# Patient Record
Sex: MALE | Race: WHITE | Employment: FULL TIME
[De-identification: names, ages, dates, MRNs, and addresses within clinical notes are randomized per-mention and may not be internally consistent; named-entity substitution may affect disease eponyms.]

---

## 2019-11-08 ENCOUNTER — HEALTH MAINTENANCE LETTER (OUTPATIENT)
Age: 54
End: 2019-11-08

## 2019-11-12 ENCOUNTER — THERAPY VISIT (OUTPATIENT)
Dept: PHYSICAL THERAPY | Facility: CLINIC | Age: 54
End: 2019-11-12
Payer: COMMERCIAL

## 2019-11-12 DIAGNOSIS — M25.511 ACUTE PAIN OF RIGHT SHOULDER: Primary | ICD-10-CM

## 2019-11-12 PROCEDURE — 97110 THERAPEUTIC EXERCISES: CPT | Mod: GP | Performed by: PHYSICAL THERAPIST

## 2019-11-12 PROCEDURE — 97161 PT EVAL LOW COMPLEX 20 MIN: CPT | Mod: GP | Performed by: PHYSICAL THERAPIST

## 2019-11-12 PROCEDURE — 97112 NEUROMUSCULAR REEDUCATION: CPT | Mod: GP | Performed by: PHYSICAL THERAPIST

## 2019-11-12 NOTE — LETTER
STEFANIE WICKINE [facility] [place]  [address]  [place] [address]  [phone]    2019    Re: Tien Dominguez   :   1965  MRN:  [id number]   REFERRING PHYSICIAN:   Sundar ARTHUR [facility] [place]    Date of Initial Evaluation:    Visits:  Rxs Used: 1  Reason for Referral:  Acute pain of right shoulder    [facility] Initial Evaluation    Subjective:  The history is provided by the patient. No  was used.   Tien Dominguez being seen for R shoulder .   Problem began 2019. Where condition occurred: at work.Problem occurred: unknown   and reported as 3/10 on pain scale. General health as reported by patient is good. Pertinent medical history includes:  High blood pressure.    Surgeries include:  Orthopedic surgery (R hip Replacement ).  Current medications:  Anti-inflammatory and high blood pressure medication.   Primary job tasks include:  Computer work, driving and lifting/carrying.  Pain is described as aching and is intermittent. Pain is the same all the time. Since onset symptoms are gradually improving. Special tests:  MRI.  There was significant improvement following previous treatment.   Patient is  . Restrictions include:  Working in normal job with restrictions.  Barriers include:  None as reported by patient.  Red flags:  None as reported by patient.    Objective:  Standing Alignment:    Cervical/Thoracic:  Forward head  Shoulder/UE:  Rounded shoulders    Gait:    Gait Type:  Normal   Weight Bearing Status:  WBAT     Flexibility/Screens:   Negative screens: Cervical   Neurological: He is alert. He has normal strength and normal reflexes. No cranial nerve deficit or sensory deficit.     Shoulder Evaluation:  ROM:    PROM:    Flexion:  Left:  160    Right: 160    Internal Rotation:  Left:  80-    Right:  80  External Rotation:  Left:  70    Right:  50    Strength:  : weak and minor pain abduction, IR, Adduction.  SIgnificant weakness ER     Special Tests:  not assessed    Palpation:  not assessed    Assessment/Plan:    Patient is a 54 year old male with right side shoulder complaints.    Patient has the following significant findings with corresponding treatment plan.                Diagnosis 1:  R shoulder pain, , glenoid hypoplasia, RTC weakness       Therapy Evaluation Codes:   1) History comprised of:   Personal factors that impact the plan of care:      None.    Comorbidity factors that impact the plan of care are:      High blood pressure.     Medications impacting care: High blood pressure.  2) Examination of Body Systems comprised of:   Body structures and functions that impact the plan of care:      Shoulder.   Activity limitations that impact the plan of care are:      Driving, Lifting, Working and Sleeping.  3) Clinical presentation characteristics are:   Stable/Uncomplicated.  4) Decision-Making    Low complexity using standardized patient assessment instrument and/or measureable assessment of functional outcome.    Cumulative Therapy Evaluation is: Low complexity.  Previous and current functional limitations:  (See Goal Flow Sheet for this information)    Short term and Long term goals: (See Goal Flow Sheet for this information)   Communication ability:  Patient appears to be able to clearly communicate and understand verbal and written communication and follow directions correctly.  Treatment Explanation - The following has been discussed with the patient:   RX ordered/plan of care  Anticipated outcomes  Possible risks and side effects  This patient would benefit from PT intervention to resume normal activities.   Rehab potential is good.    Frequency:  1 X week, once daily  Duration:  for 4 weeks  Discharge Plan:  Achieve all LTG.  Independent in home treatment program.  Reach maximal therapeutic benefit  Rehab Plan: supine RTC Program. Consult with MD within 1 weeks     Thank you for your  referral.    INQUIRIES  Therapist: Gama Villa, PT, ATC, Cert. MDT  STEFANIE ARTHUR [facility] [place]  [address]  [place] [address]  Phone: [phone]  Fax: [phone]

## 2019-11-12 NOTE — PROGRESS NOTES
[facility] Initial Evaluation  Subjective:  The history is provided by the patient. No  was used.   Tien Dominguez being seen for R shoulder .   Problem began 11/4/2019. Where condition occurred: at work.Problem occurred: unknown   and reported as 3/10 on pain scale. General health as reported by patient is good. Pertinent medical history includes:  High blood pressure.    Surgeries include:  Orthopedic surgery (R hip Replacement ).  Current medications:  Anti-inflammatory and high blood pressure medication.   Primary job tasks include:  Computer work, driving and lifting/carrying.  Pain is described as aching and is intermittent. Pain is the same all the time. Since onset symptoms are gradually improving. Special tests:  MRI.  There was significant improvement following previous treatment.   Patient is  . Restrictions include:  Working in normal job with restrictions.    Barriers include:  None as reported by patient.  Red flags:  None as reported by patient.                      Objective:  Standing Alignment:    Cervical/Thoracic:  Forward head  Shoulder/UE:  Rounded shoulders              Gait:    Gait Type:  Normal   Weight Bearing Status:  WBAT         Flexibility/Screens:   Negative screens: Cervical           Neurological: He is alert. He has normal strength and normal reflexes. No cranial nerve deficit or sensory deficit.                      Shoulder Evaluation:  ROM:    PROM:    Flexion:  Left:  160    Right: 160          Internal Rotation:  Left:  80-    Right:  80  External Rotation:  Left:  70    Right:  50                    Strength:  : weak and minor pain abduction, IR, Adduction. SIgnificant weakness ER                         Special Tests:  not assessed      Palpation:  not assessed                                         General     ROS    Assessment/Plan:    Patient is a 54 year old male with right side shoulder complaints.     Patient has the following significant findings with corresponding treatment plan.                Diagnosis 1:  R shoulder pain, , glenoid hypoplasia, RTC weakness       Therapy Evaluation Codes:   1) History comprised of:   Personal factors that impact the plan of care:      None.    Comorbidity factors that impact the plan of care are:      High blood pressure.     Medications impacting care: High blood pressure.  2) Examination of Body Systems comprised of:   Body structures and functions that impact the plan of care:      Shoulder.   Activity limitations that impact the plan of care are:      Driving, Lifting, Working and Sleeping.  3) Clinical presentation characteristics are:   Stable/Uncomplicated.  4) Decision-Making    Low complexity using standardized patient assessment instrument and/or measureable assessment of functional outcome.  Cumulative Therapy Evaluation is: Low complexity.    Previous and current functional limitations:  (See Goal Flow Sheet for this information)    Short term and Long term goals: (See Goal Flow Sheet for this information)     Communication ability:  Patient appears to be able to clearly communicate and understand verbal and written communication and follow directions correctly.  Treatment Explanation - The following has been discussed with the patient:   RX ordered/plan of care  Anticipated outcomes  Possible risks and side effects  This patient would benefit from PT intervention to resume normal activities.   Rehab potential is good.    Frequency:  1 X week, once daily  Duration:  for 4 weeks  Discharge Plan:  Achieve all LTG.  Independent in home treatment program.  Reach maximal therapeutic benefit    .Rehab Plan: supine RTC Program. Consult with MD within 1 weeks     Please refer to the daily flowsheet for treatment today, total treatment time and time spent performing 1:1 timed codes.

## 2019-11-21 ENCOUNTER — THERAPY VISIT (OUTPATIENT)
Dept: PHYSICAL THERAPY | Facility: CLINIC | Age: 54
End: 2019-11-21
Payer: COMMERCIAL

## 2019-11-21 DIAGNOSIS — M25.511 CHRONIC RIGHT SHOULDER PAIN: Primary | ICD-10-CM

## 2019-11-21 DIAGNOSIS — G89.29 CHRONIC RIGHT SHOULDER PAIN: Primary | ICD-10-CM

## 2019-11-21 PROCEDURE — 97112 NEUROMUSCULAR REEDUCATION: CPT | Mod: GP | Performed by: PHYSICAL THERAPIST

## 2019-11-21 PROCEDURE — 97110 THERAPEUTIC EXERCISES: CPT | Mod: GP | Performed by: PHYSICAL THERAPIST

## 2020-03-06 ENCOUNTER — THERAPY VISIT (OUTPATIENT)
Dept: PHYSICAL THERAPY | Facility: CLINIC | Age: 55
End: 2020-03-06
Payer: COMMERCIAL

## 2020-03-06 DIAGNOSIS — Z98.890 S/P ROTATOR CUFF REPAIR: Primary | ICD-10-CM

## 2020-03-06 PROCEDURE — 97161 PT EVAL LOW COMPLEX 20 MIN: CPT | Mod: GP | Performed by: PHYSICAL THERAPIST

## 2020-03-06 PROCEDURE — 97110 THERAPEUTIC EXERCISES: CPT | Mod: GP | Performed by: PHYSICAL THERAPIST

## 2020-03-06 NOTE — LETTER
STEFANIE [place] [facility] [place]  [address]  [place] [address]  [phone]    2020    Re: Tien Dominguez   :   1965  MRN:  [id number]   REFERRING PHYSICIAN:   Sundar ARTHUR [facility] [place]    Date of Initial Evaluation:  3/6/20  Visits:  Rxs Used: 1  Reason for Referral:  S/P rotator cuff repair    [place] [facility] Initial Evaluation    Subjective:  The history is provided by the patient. No  was used.     Patient Health History  Tien Dominguez being seen for Right shoulder postoperative rotator cuff repair, SCD, DCE resection, biceps tenodesis on 2020.   Problem began: 2020.   Problem occurred: Postsurgical    Pain is reported as 7/10 on pain scale.  General health as reported by patient is good.  Pertinent medical history includes: high blood pressure.   Red flags:  None as reported by patient.  Surgeries include:  Orthopedic surgery. Other surgery history details: Right hip replacement.    Current medications:  High blood pressure medication.    Current occupation is [employer].   Primary job tasks include:  Computer work and prolonged sitting.     Objective:  Standing Alignment:    Cervical/Thoracic:  Forward head  Shoulder/UE:  Rounded shoulders (Slight deltoid as well as teres minor atrophy)  Flexibility/Screens:   Negative screens: Cervical   Neurological: He has normal reflexes. He is unresponsive. No cranial nerve deficit or sensory deficit.     Shoulder Evaluation:  ROM:  AROM:    Flexion:  Left:  170    Right:  145    PROM:    Flexion:  Left:  170    Right: 155    Internal Rotation:  Left:  90    Right:  70  External Rotation:  Left:  80    Right:  40    Strength:  not assessed    Palpation:  Palpation assessed shoulder: Incisions well-healed clean and healing well.    Assessment/Plan:    Patient is a 54 year old male with right side shoulder complaints.    Patient has the following significant findings with corresponding treatment plan.                 Diagnosis 1: Right shoulder rotator cuff repair Decreased ROM/flexibility - manual therapy and therapeutic exercise  Decreased strength - therapeutic exercise and therapeutic activities    Therapy Evaluation Codes:   1) History comprised of:   Personal factors that impact the plan of care:      None.    Comorbidity factors that impact the plan of care are:      High blood pressure.     Medications impacting care: High blood pressure.  2) Examination of Body Systems comprised of:   Body structures and functions that impact the plan of care:      Shoulder.   Activity limitations that impact the plan of care are:      Grasping, Lifting, Working and Sleeping.  3) Clinical presentation characteristics are:   Stable/Uncomplicated.  4) Decision-Making    Low complexity using standardized patient assessment instrument and/or measureable assessment of functional outcome.    Cumulative Therapy Evaluation is: Low complexity.  Previous and current functional limitations:  (See Goal Flow Sheet for this information)    Short term and Long term goals: (See Goal Flow Sheet for this information)   Communication ability:  Patient appears to be able to clearly communicate and understand verbal and written communication and follow directions correctly.  Treatment Explanation - The following has been discussed with the patient:   RX ordered/plan of care  Anticipated outcomes  Possible risks and side effects  This patient would benefit from PT intervention to resume normal activities.   Rehab potential is good.    Frequency:  1 X week, once daily  Duration:  for 4 weeks tapering to 1 X a week, every other week over 12 weeks  Discharge Plan:  Achieve all LTG.  Independent in home treatment program.  Reach maximal therapeutic benefit.  Rehab plans: [facility] postoperative rotator cuff program.    Thank you for your referral.    INQUIRIES  Therapist: Gama Villa, PT, ATC, Cert. GABINO ARTHUR [facility] [place]  [address]  [place]  [address]  Phone: [phone]  Fax: [phone]

## 2020-03-06 NOTE — PROGRESS NOTES
[place] [facility] Initial Evaluation  Subjective:  The history is provided by the patient. No  was used.   Patient Health History  Tien Dominguez being seen for Right shoulder postoperative rotator cuff repair, SCD, DCE resection, biceps tenodesis on 1/09/2020.     Problem began: 1/9/2020.   Problem occurred: Postsurgical    Pain is reported as 7/10 on pain scale.  General health as reported by patient is good.  Pertinent medical history includes: high blood pressure.   Red flags:  None as reported by patient.     Surgeries include:  Orthopedic surgery. Other surgery history details: Right hip replacement.    Current medications:  High blood pressure medication.    Current occupation is [employer].   Primary job tasks include:  Computer work and prolonged sitting.                                    Objective:  Standing Alignment:    Cervical/Thoracic:  Forward head  Shoulder/UE:  Rounded shoulders (Slight deltoid as well as teres minor atrophy)                  Flexibility/Screens:   Negative screens: Cervical           Neurological: He has normal reflexes. He is unresponsive. No cranial nerve deficit or sensory deficit.                      Shoulder Evaluation:  ROM:  AROM:    Flexion:  Left:  170    Right:  145                          PROM:    Flexion:  Left:  170    Right: 155          Internal Rotation:  Left:  90    Right:  70  External Rotation:  Left:  80    Right:  40                    Strength:  not assessed                          Palpation:  Palpation assessed shoulder: Incisions well-healed clean and healing well.                                         General     ROS    Assessment/Plan:    Patient is a 54 year old male with right side shoulder complaints.    Patient has the following significant findings with corresponding treatment plan.                Diagnosis 1: Right shoulder rotator cuff repair Decreased ROM/flexibility - manual therapy and therapeutic  exercise  Decreased strength - therapeutic exercise and therapeutic activities    Therapy Evaluation Codes:   1) History comprised of:   Personal factors that impact the plan of care:      None.    Comorbidity factors that impact the plan of care are:      High blood pressure.     Medications impacting care: High blood pressure.  2) Examination of Body Systems comprised of:   Body structures and functions that impact the plan of care:      Shoulder.   Activity limitations that impact the plan of care are:      Grasping, Lifting, Working and Sleeping.  3) Clinical presentation characteristics are:   Stable/Uncomplicated.  4) Decision-Making    Low complexity using standardized patient assessment instrument and/or measureable assessment of functional outcome.  Cumulative Therapy Evaluation is: Low complexity.    Previous and current functional limitations:  (See Goal Flow Sheet for this information)    Short term and Long term goals: (See Goal Flow Sheet for this information)     Communication ability:  Patient appears to be able to clearly communicate and understand verbal and written communication and follow directions correctly.  Treatment Explanation - The following has been discussed with the patient:   RX ordered/plan of care  Anticipated outcomes  Possible risks and side effects  This patient would benefit from PT intervention to resume normal activities.   Rehab potential is good.    Frequency:  1 X week, once daily  Duration:  for 4 weeks tapering to 1 X a week, every other week over 12 weeks  Discharge Plan:  Achieve all LTG.  Independent in home treatment program.  Reach maximal therapeutic benefit.  Rehab plans: [facility] postoperative rotator cuff program.    Please refer to the daily flowsheet for treatment today, total treatment time and time spent performing 1:1 timed codes.

## 2020-04-09 ENCOUNTER — TELEPHONE (OUTPATIENT)
Dept: PHYSICAL THERAPY | Facility: CLINIC | Age: 55
End: 2020-04-09

## 2020-12-06 ENCOUNTER — HEALTH MAINTENANCE LETTER (OUTPATIENT)
Age: 55
End: 2020-12-06

## 2021-09-25 ENCOUNTER — HEALTH MAINTENANCE LETTER (OUTPATIENT)
Age: 56
End: 2021-09-25

## 2022-01-15 ENCOUNTER — HEALTH MAINTENANCE LETTER (OUTPATIENT)
Age: 57
End: 2022-01-15

## 2023-01-07 ENCOUNTER — HEALTH MAINTENANCE LETTER (OUTPATIENT)
Age: 58
End: 2023-01-07

## 2023-04-22 ENCOUNTER — HEALTH MAINTENANCE LETTER (OUTPATIENT)
Age: 58
End: 2023-04-22